# Patient Record
Sex: MALE | Race: WHITE | NOT HISPANIC OR LATINO | Employment: FULL TIME | URBAN - METROPOLITAN AREA
[De-identification: names, ages, dates, MRNs, and addresses within clinical notes are randomized per-mention and may not be internally consistent; named-entity substitution may affect disease eponyms.]

---

## 2020-12-15 ENCOUNTER — HOSPITAL ENCOUNTER (EMERGENCY)
Facility: HOSPITAL | Age: 55
Discharge: HOME | End: 2020-12-15
Attending: EMERGENCY MEDICINE | Admitting: EMERGENCY MEDICINE

## 2020-12-15 ENCOUNTER — APPOINTMENT (OUTPATIENT)
Dept: CT IMAGING | Facility: HOSPITAL | Age: 55
End: 2020-12-15

## 2020-12-15 VITALS
HEIGHT: 73 IN | DIASTOLIC BLOOD PRESSURE: 108 MMHG | HEART RATE: 90 BPM | SYSTOLIC BLOOD PRESSURE: 161 MMHG | OXYGEN SATURATION: 94 % | WEIGHT: 203 LBS | BODY MASS INDEX: 26.9 KG/M2 | TEMPERATURE: 97.4 F | RESPIRATION RATE: 18 BRPM

## 2020-12-15 DIAGNOSIS — R51.9 RIGHT-SIDED HEADACHE: Primary | ICD-10-CM

## 2020-12-15 DIAGNOSIS — I10 UNCONTROLLED HYPERTENSION: ICD-10-CM

## 2020-12-15 PROCEDURE — 70450 CT HEAD/BRAIN W/O DYE: CPT

## 2020-12-15 PROCEDURE — 99283 EMERGENCY DEPT VISIT LOW MDM: CPT

## 2020-12-15 RX ORDER — LISINOPRIL 20 MG/1
20 TABLET ORAL DAILY
Qty: 30 TABLET | Refills: 0 | Status: SHIPPED | OUTPATIENT
Start: 2020-12-15 | End: 2022-08-19

## 2020-12-15 NOTE — DISCHARGE INSTRUCTIONS
Home to rest.  Drink any fluids.  Follow-up with your eye doctor.  Follow-up with your family doctor regarding high blood pressure and maintenance medications.  Restart lisinopril as directed and take daily.  Return for worsening symptoms or new concerns.  Continue care with your primary care physician and have your blood pressure regularly checked and managed. Normal blood pressure is 120/80.

## 2020-12-15 NOTE — ED PROVIDER NOTES
EMERGENCY DEPARTMENT ENCOUNTER    Room Number:  B01/01  Date of encounter:  12/15/2020  PCP: Provider, No Known  Historian: patient   Full history not obtainable due to: none     HPI:  Chief Complaint: headache     Context: Taz Vu is a 55 y.o. male who presents to the ED c/o headache onset several weeks ago.  The patient reports intermittent right-sided headaches which can be mild to severe at times.  Advil and heat to help his pain but states it never fully resolves.  Pain is typically worse at night.  Denies any vision changes.  Denies any dizziness or fever.  No cough or cold symptoms.  No fall or head trauma reported.  States he is supposed to take medication for his blood pressure, 20 mg lisinopril daily, however he quit taking it several months ago and he did not think he needed it any longer.  No to the [facility] care center this a.m. and they referred him to the emergency room for further evaluation.  History of glaucoma.  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          PAST MEDICAL HISTORY    Active Ambulatory Problems     Diagnosis Date Noted   • No Active Ambulatory Problems     Resolved Ambulatory Problems     Diagnosis Date Noted   • No Resolved Ambulatory Problems     Past Medical History:   Diagnosis Date   • Anxiety    • Glaucoma    • Hypertension          PAST SURGICAL HISTORY  History reviewed. No pertinent surgical history.      FAMILY HISTORY  History reviewed. No pertinent family history.      SOCIAL HISTORY  Social History     Socioeconomic History   • Marital status:      Spouse name: Not on file   • Number of children: Not on file   • Years of education: Not on file   • Highest education level: Not on file   Tobacco Use   • Smoking status: Current Every Day Smoker     Packs/day: 0.50     Types: Cigarettes   • Smokeless tobacco: Never Used   Substance and Sexual Activity   • Alcohol use: Not Currently     Frequency: Never   • Drug use: Never          ALLERGIES  Patient has no known allergies.        REVIEW OF SYSTEMS  Review of Systems   All systems reviewed and marked as negative except as listed in HPI       PHYSICAL EXAM    I have reviewed the triage vital signs and nursing notes.    ED Triage Vitals   Temp Heart Rate Resp BP SpO2   12/15/20 1238 12/15/20 1238 12/15/20 1238 12/15/20 1343 12/15/20 1238   97.4 °F (36.3 °C) 95 16 (!) 169/105 96 %      Temp src Heart Rate Source Patient Position BP Location FiO2 (%)   12/15/20 1238 12/15/20 1238 -- -- --   Tympanic Monitor          GENERAL: Alert well developed, well nourished in no distress  HENT: NCAT, neck supple, trachea midline  EYES: no scleral icterus, left pupil is round and reactive, right pupil is nonreactive, normal conjunctivae  CV: regular rhythm, regular rate, no murmur  RESPIRATORY: unlabored effort, CTAB  ABDOMEN: soft, nontender, nondistended, bowel sounds present  MUSCULOSKELETAL: no gross deformity  NEURO: alert,  sensory and motor function of extremities grossly intact, speech clear, mental status normal/baseline  SKIN: warm, dry, no rash  PSYCH:  Appropriate mood and affect    Vital signs and nursing notes reviewed.            RADIOLOGY  Ct Head Without Contrast    Result Date: 12/15/2020  CT HEAD WO CONTRAST-  INDICATIONS: Headache  TECHNIQUE: Radiation dose reduction techniques were utilized, including automated exposure control and exposure modulation based on body size. Noncontrast head CT  COMPARISON: None available  FINDINGS:    No acute intracranial hemorrhage, midline shift or mass effect. No acute territorial infarct is identified.  Ventricles, cisterns, cerebral sulci are unremarkable for patient age.  The visualized paranasal sinuses, orbits, mastoid air cells are unremarkable.           No acute intracranial hemorrhage or hydrocephalus. If there is further clinical concern, MRI could be considered for further evaluation.  This report was finalized on 12/15/2020 5:12 PM by  Dr. Clark Crisostomo M.D.        I ordered the above noted radiological studies. Independently reviewed by me and discussed with radiologist.  See dictation above for official radiology interpretation.      PROCEDURES    Procedures        MEDICATIONS GIVEN IN ER    Medications - No data to display      PROGRESS, DATA ANALYSIS, CONSULTS, AND MEDICAL DECISION MAKING    All labs have been independently reviewed by me.  All radiology studies have been reviewed by me.   EKG's independently reviewed by me.  Discussion below represents my analysis of pertinent findings related to patient's condition, differential diagnosis, treatment plan and final disposition.    DIFFERENTIAL DIAGNOSIS INCLUDE BUT NOT LIMITED TO: Cluster headache, migraine headache, tension headache, acute closure glaucoma, brain tumor, meningitis, temporal arteritis    ED Course as of Dec 15 1944   Tue Dec 15, 2020   1645 I viewed head CT on PACS system.  My findings are no midline shift.    [JS]   1730 Head CT is negative.  There would be concern for underlying glaucoma however the patient has previously suffered from glaucoma and is blind in the right eye as a consequence.  We discussed follow-up with his primary care doctor for ongoing headaches.  I will restart his lisinopril at the prior dose he was taking of 20 mg and encouraged him to continue taking it as prescribed.  I also encouraged him to follow-up with the eye doctor given the pressure behind his right eye.  He states understanding the plan.    [JS]      ED Course User Index  [JS] Lona Espino, APRN       AS OF 19:44 EST VITALS:        BP - (!) 161/108  HR - 90  TEMP - 97.4 °F (36.3 °C) (Tympanic)  02 SATS - 94%          DIAGNOSIS  Final diagnoses:   Right-sided headache   Uncontrolled hypertension         DISPOSITION  Discharge     Pt masked in first look. I wore a surgical mask and protective eye wear throughout my encounters with the pt. I performed hand hygiene on entry into  the pt room and upon exit.     Dictated utilizing Dragon dictation:  Much of this encounter note is an electronic transcription/translation of spoken language to printed text. The electronic translation of spoken language may permit erroneous, or at times, nonsensical words or phrases to be inadvertently transcribed; Although I have reviewed the note for such errors, some may still exist.       Lona Espino, APRN  12/15/20 1945

## 2020-12-15 NOTE — ED PROVIDER NOTES
The patient was wearing a facemask upon entrance into the room and remained in such throughout their visit.  I was wearing PPE including a facemask, eye protection, as well as gloves at any point entering the room and throughout the visit    This patient is a 55-year-old male presenting to the emergency room today with a right-sided headache for several days to weeks duration.  He states that he has a history of glaucoma in his right eye and is actually lost vision 100% in the right eye secondary to that.  He denies any cough or cold symptoms or any fevers and chills.    Exam: This patient is resting comfortably and in no distress, without gross neurological deficit.  He is afebrile with stable vital signs and nontoxic in appearance.  There is no temporal artery tenderness.  Neck is supple without meningeal signs.    Plan: CT scan of the head will be obtained.  The patient will certainly receive a prescription for his antihypertensive medication since he has been out for quite some time now.  We will reassess following CT scan.      The patient was wearing a facemask upon entrance into the room and remained in such throughout their visit.  I was wearing PPE including a facemask, eye protection, as well as gloves at any point entering the room and throughout the visit      Willis Caballero MD  12/15/20 2032

## 2020-12-15 NOTE — ED TRIAGE NOTES
Hendrix x 3 weeks.  Is taking advil but can still feel pressure    Patient was placed in face mask during first look triage.  Patient was wearing a face mask throughout encounter.  I wore personal protective equipment throughout the encounter.  Hand hygiene was performed before and after patient encounter.

## 2020-12-15 NOTE — ED NOTES
Pt c/o headache that begins behind right eye and radiates to right temporal lobe. States this has been going on for approx 3 weeks. Pt is completely blind in right eye due to glaucoma for approx 1 year. States he feels pressure behind that right eye. No neuro deficits noted.   Pt wearing mask throughout encouter. This RN wearing mask and goggles throughout encounter.       Bianca Mcneill, RN  12/15/20 5437

## 2022-08-19 ENCOUNTER — HOSPITAL ENCOUNTER (EMERGENCY)
Facility: HOSPITAL | Age: 57
Discharge: ANOTHER HOSPITAL | End: 2022-08-19
Attending: EMERGENCY MEDICINE | Admitting: EMERGENCY MEDICINE

## 2022-08-19 VITALS
RESPIRATION RATE: 18 BRPM | OXYGEN SATURATION: 95 % | HEIGHT: 73 IN | DIASTOLIC BLOOD PRESSURE: 113 MMHG | BODY MASS INDEX: 23.59 KG/M2 | SYSTOLIC BLOOD PRESSURE: 162 MMHG | TEMPERATURE: 98.1 F | HEART RATE: 79 BPM | WEIGHT: 178 LBS

## 2022-08-19 DIAGNOSIS — F22 PARANOIA: Primary | ICD-10-CM

## 2022-08-19 DIAGNOSIS — R44.0 AUDITORY HALLUCINATIONS: ICD-10-CM

## 2022-08-19 LAB
AMPHET+METHAMPHET UR QL: NEGATIVE
ANION GAP SERPL CALCULATED.3IONS-SCNC: 15 MMOL/L (ref 5–15)
BARBITURATES UR QL SCN: NEGATIVE
BASOPHILS # BLD AUTO: 0.08 10*3/MM3 (ref 0–0.2)
BASOPHILS NFR BLD AUTO: 0.7 % (ref 0–1.5)
BENZODIAZ UR QL SCN: NEGATIVE
BUN SERPL-MCNC: 17 MG/DL (ref 6–20)
BUN/CREAT SERPL: 14.3 (ref 7–25)
CALCIUM SPEC-SCNC: 9 MG/DL (ref 8.6–10.5)
CANNABINOIDS SERPL QL: NEGATIVE
CHLORIDE SERPL-SCNC: 108 MMOL/L (ref 98–107)
CO2 SERPL-SCNC: 18 MMOL/L (ref 22–29)
COCAINE UR QL: NEGATIVE
CREAT SERPL-MCNC: 1.19 MG/DL (ref 0.76–1.27)
DEPRECATED RDW RBC AUTO: 47.1 FL (ref 37–54)
EGFRCR SERPLBLD CKD-EPI 2021: 72.6 ML/MIN/1.73
EOSINOPHIL # BLD AUTO: 0.39 10*3/MM3 (ref 0–0.4)
EOSINOPHIL NFR BLD AUTO: 3.6 % (ref 0.3–6.2)
ERYTHROCYTE [DISTWIDTH] IN BLOOD BY AUTOMATED COUNT: 13.3 % (ref 12.3–15.4)
ETHANOL BLD-MCNC: <10 MG/DL (ref 0–10)
ETHANOL UR QL: <0.01 %
GLUCOSE SERPL-MCNC: 104 MG/DL (ref 65–99)
HCT VFR BLD AUTO: 43.5 % (ref 37.5–51)
HGB BLD-MCNC: 14.8 G/DL (ref 13–17.7)
HOLD SPECIMEN: NORMAL
IMM GRANULOCYTES # BLD AUTO: 0.04 10*3/MM3 (ref 0–0.05)
IMM GRANULOCYTES NFR BLD AUTO: 0.4 % (ref 0–0.5)
LYMPHOCYTES # BLD AUTO: 2.94 10*3/MM3 (ref 0.7–3.1)
LYMPHOCYTES NFR BLD AUTO: 26.8 % (ref 19.6–45.3)
MCH RBC QN AUTO: 32.8 PG (ref 26.6–33)
MCHC RBC AUTO-ENTMCNC: 34 G/DL (ref 31.5–35.7)
MCV RBC AUTO: 96.5 FL (ref 79–97)
METHADONE UR QL SCN: NEGATIVE
MONOCYTES # BLD AUTO: 0.88 10*3/MM3 (ref 0.1–0.9)
MONOCYTES NFR BLD AUTO: 8 % (ref 5–12)
NEUTROPHILS NFR BLD AUTO: 6.64 10*3/MM3 (ref 1.7–7)
NEUTROPHILS NFR BLD AUTO: 60.5 % (ref 42.7–76)
NRBC BLD AUTO-RTO: 0 /100 WBC (ref 0–0.2)
OPIATES UR QL: NEGATIVE
OXYCODONE UR QL SCN: NEGATIVE
PLATELET # BLD AUTO: 287 10*3/MM3 (ref 140–450)
PMV BLD AUTO: 10.5 FL (ref 6–12)
POTASSIUM SERPL-SCNC: 3.5 MMOL/L (ref 3.5–5.2)
RBC # BLD AUTO: 4.51 10*6/MM3 (ref 4.14–5.8)
SARS-COV-2 RNA RESP QL NAA+PROBE: NOT DETECTED
SODIUM SERPL-SCNC: 141 MMOL/L (ref 136–145)
WBC NRBC COR # BLD: 10.97 10*3/MM3 (ref 3.4–10.8)
WHOLE BLOOD HOLD COAG: NORMAL
WHOLE BLOOD HOLD SPECIMEN: NORMAL

## 2022-08-19 PROCEDURE — 80307 DRUG TEST PRSMV CHEM ANLYZR: CPT | Performed by: EMERGENCY MEDICINE

## 2022-08-19 PROCEDURE — 85025 COMPLETE CBC W/AUTO DIFF WBC: CPT | Performed by: NURSE PRACTITIONER

## 2022-08-19 PROCEDURE — 80048 BASIC METABOLIC PNL TOTAL CA: CPT | Performed by: NURSE PRACTITIONER

## 2022-08-19 PROCEDURE — 82077 ASSAY SPEC XCP UR&BREATH IA: CPT

## 2022-08-19 PROCEDURE — U0003 INFECTIOUS AGENT DETECTION BY NUCLEIC ACID (DNA OR RNA); SEVERE ACUTE RESPIRATORY SYNDROME CORONAVIRUS 2 (SARS-COV-2) (CORONAVIRUS DISEASE [COVID-19]), AMPLIFIED PROBE TECHNIQUE, MAKING USE OF HIGH THROUGHPUT TECHNOLOGIES AS DESCRIBED BY CMS-2020-01-R: HCPCS | Performed by: EMERGENCY MEDICINE

## 2022-08-19 PROCEDURE — 90791 PSYCH DIAGNOSTIC EVALUATION: CPT

## 2022-08-19 PROCEDURE — 99284 EMERGENCY DEPT VISIT MOD MDM: CPT

## 2022-08-19 RX ORDER — DIVALPROEX SODIUM 250 MG/1
150 TABLET, EXTENDED RELEASE ORAL DAILY
COMMUNITY

## 2022-08-19 RX ORDER — ACETAZOLAMIDE 250 MG/1
TABLET ORAL 2 TIMES DAILY
COMMUNITY

## 2022-08-19 RX ORDER — VENLAFAXINE HYDROCHLORIDE 150 MG/1
150 CAPSULE, EXTENDED RELEASE ORAL DAILY
COMMUNITY

## 2022-08-19 RX ORDER — HYDROCHLOROTHIAZIDE 12.5 MG/1
12.5 TABLET ORAL ONCE
Status: COMPLETED | OUTPATIENT
Start: 2022-08-19 | End: 2022-08-19

## 2022-08-19 RX ADMIN — HYDROCHLOROTHIAZIDE 12.5 MG: 12.5 TABLET ORAL at 19:06
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
## 2022-08-19 NOTE — CASE MANAGEMENT/SOCIAL WORK
Spoke to Josie at [facility]-  [facility] has accepted pt for inpatient psych admission.  Accepting doctor- Dr. Begum  Pt will be going to the [facility]-  Info given to SHAHBAZ Anderson, and to MAURY Koenig CCP.   Transportation to [facility] is scheduled for 9:30pm.
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
## 2022-08-19 NOTE — ED NOTES
Sitter at bedside at this time, removed pt of belongings and placed at bedside.   Pt given boxed lunch and drink, ok'd by MD.

## 2022-08-19 NOTE — ED PROVIDER NOTES
EMERGENCY DEPARTMENT ENCOUNTER    Room Number:  17/17  Date of encounter:  8/19/2022  PCP: Onesimo Harrell MD  Historian: Patient      PPE    Patient was placed in face mask in first look. Patient was wearing facemask when I entered the room and throughout our encounter. I wore full protective equipment throughout this patient encounter including a face mask, and gloves. Hand hygiene was performed before donning protective equipment and after removal when leaving the room.        HPI:  Chief Complaint: Auditory hallucinations  A complete HPI/ROS/PMH/PSH/SH/FH are unobtainable due to: hallucinations    Context: Taz Vu is a 56 y.o. male with history or anxiety, HTN who arrives to the ED via private vehicle with his son.  Patient presents today for approximately a week history of paranoia and auditory hallucinations.  He states that he has been noticing his neighbors outside talking about him, threatening to kill him.  He states that it was so bad 1 night that he drove to [place] where his wife's sister lives to get away.  He states that he could hear them there to the point where he had a gun and was concerned there were any come in the house and harm him.  He states he has tried to notify the police here when he has heard them outside.  He denies SI at this time.  He states the last time he heard the voices was last night.  He has even stated his son's apartment and felt like they were out there also.  He states he did attempt suicide several years ago by using helium, put a bag around his head and tried to hang himself.  Patient denies any complaints at this time including chest pain, shortness of breath, dizziness, weakness, vomiting.  Patient states he is not a drinker, denies drug use, is a smoker.  He states he last saw his psychiatrist about a month ago.        PAST MEDICAL HISTORY  Active Ambulatory Problems     Diagnosis Date Noted   • No Active Ambulatory Problems     Resolved Ambulatory  Problems     Diagnosis Date Noted   • No Resolved Ambulatory Problems     Past Medical History:   Diagnosis Date   • Anxiety    • Glaucoma    • Hypertension          PAST SURGICAL HISTORY  History reviewed. No pertinent surgical history.      FAMILY HISTORY  History reviewed. No pertinent family history.      SOCIAL HISTORY  Social History     Socioeconomic History   • Marital status:    Tobacco Use   • Smoking status: Current Every Day Smoker     Packs/day: 0.50     Types: Cigarettes   • Smokeless tobacco: Never Used   Substance and Sexual Activity   • Alcohol use: Not Currently   • Drug use: Never         ALLERGIES  Patient has no known allergies.        REVIEW OF SYSTEMS  Review of Systems     All systems reviewed and negative except for those discussed in HPI.        PHYSICAL EXAM    ED Triage Vitals   Temp Heart Rate Resp BP SpO2   08/19/22 1016 08/19/22 1016 08/19/22 1016 08/19/22 1044 08/19/22 1016   97.2 °F (36.2 °C) 91 16 (!) 178/120 98 %       Physical Exam  GENERAL: Well appearing, nontoxic appearing, not distressed  HENT: normocephalic, atraumatic  EYES: no scleral icterus, PERRL  CV: regular rhythm, regular rate, no murmur  RESPIRATORY: normal effort, CTAB  ABDOMEN: soft   MUSCULOSKELETAL: no deformity  NEURO: alert, moves all extremities, follows commands, mental status normal/baseline  SKIN: warm, dry, no rash   Psych: Flat affect, paranoia  Nursing notes and vital signs reviewed      LAB RESULTS  Recent Results (from the past 24 hour(s))   Ethanol    Collection Time: 08/19/22 10:48 AM    Specimen: Blood   Result Value Ref Range    Ethanol <10 0 - 10 mg/dL    Ethanol % <0.010 %   Urine Drug Screen - Urine, Clean Catch    Collection Time: 08/19/22 10:48 AM    Specimen: Urine, Clean Catch   Result Value Ref Range    Amphet/Methamphet, Screen Negative Negative    Barbiturates Screen, Urine Negative Negative    Benzodiazepine Screen, Urine Negative Negative    Cocaine Screen, Urine Negative  Negative    Opiate Screen Negative Negative    THC, Screen, Urine Negative Negative    Methadone Screen, Urine Negative Negative    Oxycodone Screen, Urine Negative Negative   Green Top (Gel)    Collection Time: 08/19/22 10:48 AM   Result Value Ref Range    Extra Tube Hold for add-ons.    Lavender Top    Collection Time: 08/19/22 10:48 AM   Result Value Ref Range    Extra Tube hold for add-on    Light Blue Top    Collection Time: 08/19/22 10:48 AM   Result Value Ref Range    Extra Tube Hold for add-ons.    Basic Metabolic Panel    Collection Time: 08/19/22 10:48 AM    Specimen: Blood   Result Value Ref Range    Glucose 104 (H) 65 - 99 mg/dL    BUN 17 6 - 20 mg/dL    Creatinine 1.19 0.76 - 1.27 mg/dL    Sodium 141 136 - 145 mmol/L    Potassium 3.5 3.5 - 5.2 mmol/L    Chloride 108 (H) 98 - 107 mmol/L    CO2 18.0 (L) 22.0 - 29.0 mmol/L    Calcium 9.0 8.6 - 10.5 mg/dL    BUN/Creatinine Ratio 14.3 7.0 - 25.0    Anion Gap 15.0 5.0 - 15.0 mmol/L    eGFR 72.6 >60.0 mL/min/1.73   CBC Auto Differential    Collection Time: 08/19/22 10:48 AM    Specimen: Blood   Result Value Ref Range    WBC 10.97 (H) 3.40 - 10.80 10*3/mm3    RBC 4.51 4.14 - 5.80 10*6/mm3    Hemoglobin 14.8 13.0 - 17.7 g/dL    Hematocrit 43.5 37.5 - 51.0 %    MCV 96.5 79.0 - 97.0 fL    MCH 32.8 26.6 - 33.0 pg    MCHC 34.0 31.5 - 35.7 g/dL    RDW 13.3 12.3 - 15.4 %    RDW-SD 47.1 37.0 - 54.0 fl    MPV 10.5 6.0 - 12.0 fL    Platelets 287 140 - 450 10*3/mm3    Neutrophil % 60.5 42.7 - 76.0 %    Lymphocyte % 26.8 19.6 - 45.3 %    Monocyte % 8.0 5.0 - 12.0 %    Eosinophil % 3.6 0.3 - 6.2 %    Basophil % 0.7 0.0 - 1.5 %    Immature Grans % 0.4 0.0 - 0.5 %    Neutrophils, Absolute 6.64 1.70 - 7.00 10*3/mm3    Lymphocytes, Absolute 2.94 0.70 - 3.10 10*3/mm3    Monocytes, Absolute 0.88 0.10 - 0.90 10*3/mm3    Eosinophils, Absolute 0.39 0.00 - 0.40 10*3/mm3    Basophils, Absolute 0.08 0.00 - 0.20 10*3/mm3    Immature Grans, Absolute 0.04 0.00 - 0.05 10*3/mm3    nRBC 0.0  0.0 - 0.2 /100 WBC   COVID-19,[facility] IN-HOUSE CEPHEID/FIDENCIO NP SWAB IN TRANSPORT MEDIA 8-12 HR TAT - Swab, Nasopharynx    Collection Time: 08/19/22  1:15 PM    Specimen: Nasopharynx; Swab   Result Value Ref Range    COVID19 Not Detected Not Detected - Ref. Range       Ordered the above labs and independently reviewed the results.      RADIOLOGY  No Radiology Exams Resulted Within Past 24 Hours    I ordered the above noted radiological studies and viewed the images on the PACS system.         MEDICAL RECORD REVIEW  Medical records reviewed in [facility], patient last had a CT of the head in 2020 that was unremarkable.      PROCEDURES    Procedures        DIFFERENTIAL DIAGNOSIS  Differential diagnosis include but are not limited to the following: Paranoia, hallucinations, pression, anxiety      PROGRESS, DATA ANALYSIS, CONSULTS, AND MEDICAL DECISION MAKING        ED Course as of 08/19/22 1906   Fri Aug 19, 2022   1117 Spoke with patient's wife Sol who states that 2 and half years ago he did attempt suicide.  She states since that time he has had some issues with paranoia regarding their neighbors.  She states she is never heard them say that things or do the things that the patient had thinks he has.  She states 1 night last week he was hiding down behind the window with a gun talking to the police about his neighbors.  She states during that time he left to go to [place] where her sister lives because he did not feel safe at home any longer.  She states that he had an incident like this in [place] also.  Her sister and brother-in-law were able to get the gun from him and have it in their possession.  She states that since he has been back he is tried staying with their son but still feels like the neighbors are following them there.  She has spoken to a psychiatrist who feels patient needs to be admitted for medication adjustment.  Discussed with her that we will check labs, have [facility] evaluate her and we will update her  when we can. [MS]   1216 Reviewed pt's history and workup with Dr. Duarte.  After a bedside evaluation, he agrees with the plan of care.     [MS]   1304 Maria E with [facility] is seen and evaluated patient in the ER.  She would like patient placed on a 72-hour hold, and she is going to start working on finding a inpatient facility for patient to be transferred to. [MS]   1525 Spoke with [facility], patient has been accepted at [facility] by Dr Begum. GORDO Koenig RN has arranged for EMS transport, they will be here at 930 pm tonight.  Patient and wife updated on this plan and both are agreeable. [MS]      ED Course User Index  [MS] Monica Diallo, SHAHBAZ      Diagnosis Plan   1. Paranoia (HCC)     2. Auditory hallucinations             MEDICATIONS GIVEN IN ED    Medications   hydroCHLOROthiazide (HYDRODIURIL) tablet 12.5 mg (12.5 mg Oral Given 8/19/22 1906)           COURSE & MEDICAL DECISION MAKING  Any/All labs and Any/All Imaging studies that were ordered were reviewed and are noted above.  Results were reviewed/discussed with the patient and they were also made aware of online access.    Pt also made aware that some labs, such as cultures, will not be resulted during ER visit and followup with PMD is necessary.        Monica Diallo APRN  08/19/22 1906

## 2022-08-19 NOTE — ED TRIAGE NOTES
Patient states he has been seeing behavior health. Over the last few days patient has had paranoia and auditory hallucinations. Currently denies SI.